# Patient Record
Sex: FEMALE | Race: BLACK OR AFRICAN AMERICAN | NOT HISPANIC OR LATINO | URBAN - METROPOLITAN AREA
[De-identification: names, ages, dates, MRNs, and addresses within clinical notes are randomized per-mention and may not be internally consistent; named-entity substitution may affect disease eponyms.]

---

## 2023-11-28 ENCOUNTER — EMERGENCY (EMERGENCY)
Facility: HOSPITAL | Age: 28
LOS: 0 days | Discharge: HOME | End: 2023-11-29
Attending: STUDENT IN AN ORGANIZED HEALTH CARE EDUCATION/TRAINING PROGRAM
Payer: COMMERCIAL

## 2023-11-28 VITALS
RESPIRATION RATE: 20 BRPM | HEIGHT: 69 IN | WEIGHT: 177.03 LBS | OXYGEN SATURATION: 97 % | SYSTOLIC BLOOD PRESSURE: 119 MMHG | DIASTOLIC BLOOD PRESSURE: 80 MMHG | TEMPERATURE: 98 F | HEART RATE: 94 BPM

## 2023-11-28 DIAGNOSIS — S29.012A STRAIN OF MUSCLE AND TENDON OF BACK WALL OF THORAX, INITIAL ENCOUNTER: ICD-10-CM
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
## 2023-11-28 DIAGNOSIS — M54.2 CERVICALGIA: ICD-10-CM
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
## 2023-11-28 DIAGNOSIS — M54.6 PAIN IN THORACIC SPINE: ICD-10-CM

## 2023-11-28 PROCEDURE — 99284 EMERGENCY DEPT VISIT MOD MDM: CPT

## 2023-11-28 RX ORDER — CYCLOBENZAPRINE HYDROCHLORIDE 10 MG/1
10 TABLET, FILM COATED ORAL ONCE
Refills: 0 | Status: COMPLETED | OUTPATIENT
Start: 2023-11-28 | End: 2023-11-28

## 2023-11-28 RX ORDER — LIDOCAINE 4 G/100G
1 CREAM TOPICAL ONCE
Refills: 0 | Status: COMPLETED | OUTPATIENT
Start: 2023-11-28 | End: 2023-11-28

## 2023-11-28 RX ADMIN — CYCLOBENZAPRINE HYDROCHLORIDE 10 MILLIGRAM(S): 10 TABLET, FILM COATED ORAL at 23:04

## 2023-11-28 RX ADMIN — LIDOCAINE 1 PATCH: 4 CREAM TOPICAL at 23:04

## 2023-11-28 RX ADMIN — Medication 500 MILLIGRAM(S): at 23:03
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
## 2023-11-28 NOTE — ED PROVIDER NOTE - CLINICAL SUMMARY MEDICAL DECISION MAKING FREE TEXT BOX
Pt presents with 1 day of L lateral paraspinal neck pain/tightness radiating down to upper back and across to upper shoulder region following trapezius muscle. No midline spinal ttp. No neuro deficits. Will dose nsaids, muscle relaxant, lido patch and reassess. Does not meet nexus c spine criteria. Pt presents with 1 day of L lateral paraspinal neck pain/tightness radiating down to upper back and across to upper shoulder region following trapezius muscle. No midline spinal ttp. No neuro deficits. Will dose nsaids, muscle relaxant, lido patch and reassess. Does not meet nexus c spine criteria.    Pt reports improvement of sx and stable for dc home. Return precautions given.
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
## 2023-11-28 NOTE — ED ADULT NURSE NOTE - NSFALLUNIVINTERV_ED_ALL_ED
Bed/Stretcher in lowest position, wheels locked, appropriate side rails in place/Call bell, personal items and telephone in reach/Instruct patient to call for assistance before getting out of bed/chair/stretcher/Non-slip footwear applied when patient is off stretcher/[place] to call system/Physically safe environment - no spills, clutter or unnecessary equipment/Purposeful proactive rounding/Room/bathroom lighting operational, light cord in reach
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
## 2023-11-28 NOTE — ED PROVIDER NOTE - PHYSICAL EXAMINATION
General: well appearing in nad, sitting in stretcher comfortably  HEENT: NC/AT, MMM, no c/t/l midline ttp  Resp: no respiratory distress, tachypnea, or accessory muscle usage  Ext: no obvious deformities, equal b/l hand  strength  Neuro: axoxo3, speaking full sentences, ambulatory, moving all extremities

## 2023-11-28 NOTE — ED PROVIDER NOTE - PATIENT PORTAL LINK FT
You can access the FollowMyHealth Patient Portal offered by [facility] by registering at the following website: [email or web address]. By joining [facility]’s FollowMyHealth portal, you will also be able to view your health information using other applications (apps) compatible with our system.

## 2023-11-29 VITALS
OXYGEN SATURATION: 100 % | SYSTOLIC BLOOD PRESSURE: 113 MMHG | TEMPERATURE: 98 F | DIASTOLIC BLOOD PRESSURE: 76 MMHG | HEART RATE: 73 BPM | RESPIRATION RATE: 19 BRPM

## 2023-11-29 RX ORDER — CYCLOBENZAPRINE HYDROCHLORIDE 10 MG/1
1 TABLET, FILM COATED ORAL
Qty: 10 | Refills: 0
Start: 2023-11-29 | End: 2023-12-03

## 2023-11-29 RX ORDER — ACETAMINOPHEN 500 MG
2 TABLET ORAL
Qty: 60 | Refills: 0
Start: 2023-11-29 | End: 2023-12-03

## 2023-11-29 RX ORDER — LIDOCAINE 4 G/100G
1 CREAM TOPICAL
Qty: 10 | Refills: 0
Start: 2023-11-29 | End: 2023-12-03
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
